# Patient Record
Sex: FEMALE | Race: OTHER | HISPANIC OR LATINO
[De-identification: names, ages, dates, MRNs, and addresses within clinical notes are randomized per-mention and may not be internally consistent; named-entity substitution may affect disease eponyms.]

---

## 2018-10-02 ENCOUNTER — TRANSCRIPTION ENCOUNTER (OUTPATIENT)
Age: 38
End: 2018-10-02

## 2019-02-11 ENCOUNTER — TRANSCRIPTION ENCOUNTER (OUTPATIENT)
Age: 39
End: 2019-02-11

## 2021-03-17 PROBLEM — Z00.00 ENCOUNTER FOR PREVENTIVE HEALTH EXAMINATION: Status: ACTIVE | Noted: 2021-03-17

## 2021-03-18 ENCOUNTER — APPOINTMENT (OUTPATIENT)
Dept: GASTROENTEROLOGY | Facility: CLINIC | Age: 41
End: 2021-03-18
Payer: COMMERCIAL

## 2021-03-18 VITALS
HEART RATE: 72 BPM | DIASTOLIC BLOOD PRESSURE: 62 MMHG | SYSTOLIC BLOOD PRESSURE: 106 MMHG | HEIGHT: 64 IN | TEMPERATURE: 97.6 F | BODY MASS INDEX: 33.63 KG/M2 | WEIGHT: 197 LBS

## 2021-03-18 DIAGNOSIS — K52.9 NONINFECTIVE GASTROENTERITIS AND COLITIS, UNSPECIFIED: ICD-10-CM

## 2021-03-18 PROCEDURE — 99204 OFFICE O/P NEW MOD 45 MIN: CPT | Mod: 25

## 2021-03-18 PROCEDURE — 99072 ADDL SUPL MATRL&STAF TM PHE: CPT

## 2021-03-18 PROCEDURE — 36415 COLL VENOUS BLD VENIPUNCTURE: CPT

## 2021-03-18 RX ORDER — MULTIVITAMIN
TABLET ORAL
Refills: 0 | Status: ACTIVE | COMMUNITY

## 2021-03-18 RX ORDER — OXYCODONE 10 MG/1
10 TABLET ORAL
Refills: 0 | Status: ACTIVE | COMMUNITY

## 2021-03-18 RX ORDER — OMEPRAZOLE 40 MG/1
40 CAPSULE, DELAYED RELEASE ORAL
Refills: 0 | Status: ACTIVE | COMMUNITY

## 2021-03-18 RX ORDER — FAMOTIDINE 40 MG/1
40 TABLET, FILM COATED ORAL
Refills: 0 | Status: ACTIVE | COMMUNITY

## 2021-03-18 NOTE — HISTORY OF PRESENT ILLNESS
[FreeTextEntry1] : 40f fibromyalgia, chronic back pain/opiates, GERD, sleeve gastrectomy '18, here for chronic GERD, bloating, diarrhea.  Has had GERD for many years - reportedly w/ repair of a small h. hernia at time of sleeve.  Heartburn has worsened in last 2y. No dysphagia. + 60lb wt loss after sleeve.  Seems to have hit plateau.  \par Had ENT eval also c/w GERD per pt - and up to omeprazole 40bid and h2b nightly now and still daily symptoms.  No clear triggers.  \par \par Separately, she states that she can't have a normal BM.  States she has to take coffee to have bm, but always loose stool.  Lower abd bloating x years, relieved by BM.   No BRBPR.  \par \par She has chronic elevation of CRP and ESR.\par \par Prior w/u:\par Dr. Perez: \par 4/2016 colonoscopy for irregular bowel habits - normal, exc hemorrhoids.  No bxs.\par 5/2016 EGD w/ moderate h. hernia, A/B esophagitis, schatzki, gastritis; bx: gastritis/reflux change; inc. IEL duodenum\par Stool studies reportedly normal.\par 2016 SBS normal.\par \par Takes her opiates about 2x / wk when body pains at most severe.  Unchanged frequency\par \par Soc:  no tobacco or significant EtOH\par FHx: no FHx GI malignancy or IBD\par \par ROS:\par Constitutional:: no weight loss, fevers\par ENT: no deafness\par Eyes: not blind\par Neck: no LN\par Chest: no dyspnea/cough\par Cardiac: no chest pain\par Vascular: no leg swelling\par GI: no abdominal pain, nausea, vomiting, diarrhea, constipation, rectal bleeding, dysphagia, melena unless otherwise noted in HPI\par : no dysuria, dark urine\par Skin: no rashes, jaundice\par Heme: no bleeding\par Endocrine: no DM unless otherwise stated in HPI\par \par Px: (VS noted below)\par General: NAD\par Eyes: anicteric\par Oropharynx:  clear\par Neck: no LN\par Chest: normal respiratory effort\par CVS: regular\par Abd: soft, NT, ND, +BS, no HSM\par Ext: no atrophy\par Neuro: grossly nonfocal\par \par Labs/imaging/prior endoscopic results reviewed to the extent available and noted in HPI\par

## 2021-03-18 NOTE — REASON FOR VISIT
[Consultation] : a consultation visit [FreeTextEntry1] : Kindly asked by Dr. Gonsalez to consult and evaluate patient for acid reflux, abdominal bloating, abnormal BMs              \par A copy of this note is being sent to physician requesting consultation.

## 2021-03-18 NOTE — ASSESSMENT
[FreeTextEntry1] : -chronic diarrhea - suspect overflow in light of pattern requiring laxatives/coffee stimulus.  Will check for celiac Abs.  Someopiate use compounding problem.  Will start fiber bulking.  Colonoscopy if neg celiac Ab to r/o microscopic colitis\par \par - GERD - Reviewed dietary and lifestyle modifications, including weight loss, smaller/frequent/earlier (>3h prior to lying down) meals, trials of cutting down/out caffeine, chocolate, tomatoes, fatty foods, alcohol.  Sleeve anatomy perhaps contributing.  Will plan EGD as she had erosive change on prior and is now not responding to fairly maximized medical tx; and also to eval for more spec celiac findings\par \par PMD/consultation notes and Labs/imaging/prior endoscopic results reviewed to extent noted in HPI; and, if procedure code billed on this visit for lab draw, this serves to signify that labs were drawn here in this office.\par \par

## 2021-04-09 ENCOUNTER — RESULT REVIEW (OUTPATIENT)
Age: 41
End: 2021-04-09

## 2021-04-11 ENCOUNTER — RESULT REVIEW (OUTPATIENT)
Age: 41
End: 2021-04-11

## 2021-04-12 ENCOUNTER — APPOINTMENT (OUTPATIENT)
Dept: GASTROENTEROLOGY | Facility: HOSPITAL | Age: 41
End: 2021-04-12

## 2021-04-12 ENCOUNTER — RESULT REVIEW (OUTPATIENT)
Age: 41
End: 2021-04-12

## 2021-06-14 ENCOUNTER — APPOINTMENT (OUTPATIENT)
Dept: GASTROENTEROLOGY | Facility: CLINIC | Age: 41
End: 2021-06-14

## 2021-12-08 ENCOUNTER — APPOINTMENT (OUTPATIENT)
Dept: GASTROENTEROLOGY | Facility: CLINIC | Age: 41
End: 2021-12-08
Payer: COMMERCIAL

## 2021-12-08 VITALS
BODY MASS INDEX: 30.9 KG/M2 | SYSTOLIC BLOOD PRESSURE: 102 MMHG | HEART RATE: 80 BPM | OXYGEN SATURATION: 99 % | TEMPERATURE: 97.7 F | HEIGHT: 64 IN | WEIGHT: 181 LBS | DIASTOLIC BLOOD PRESSURE: 70 MMHG

## 2021-12-08 PROCEDURE — 99072 ADDL SUPL MATRL&STAF TM PHE: CPT

## 2021-12-08 PROCEDURE — 99214 OFFICE O/P EST MOD 30 MIN: CPT | Mod: 25

## 2021-12-08 PROCEDURE — 36415 COLL VENOUS BLD VENIPUNCTURE: CPT

## 2021-12-08 NOTE — HISTORY OF PRESENT ILLNESS
[FreeTextEntry1] : 40f fibromyalgia, chronic back pain/opiates, GERD, sleeve gastrectomy '18, GERD, here for f/u chronic bloating, constipation/diarrhea. \par \par -4/2021 EGD for gerd - gastritis; some patchy increased IELs--> labs never received by lab?\par -4/2021 colonoscopy for chronic diarrhea - normal\par \par GERD - better controllde now on omeprazole 40mg\par \par Had liposcuction 2mo ago - some vague "tightness" in abdomen since, with cont'd bloating, and more constipation - BMs q3-4d, then w/ dulcolax has crampy diarrhea.  No n/v/fever.\par \par Sx of bloating resolve w/ BMs.\par \par Labs 11/2021 had mild anemia 10s- sl. low iron indices.  Normal LFTs. \par \par She has chronic elevation of CRP and ESR.\par \par Prior w/u:\par Dr. Perez: \par 4/2016 colonoscopy for irregular bowel habits - normal, exc hemorrhoids.  No bxs.\par 5/2016 EGD w/ moderate h. hernia, A/B esophagitis, schatzki, gastritis; bx: gastritis/reflux change; inc. IEL duodenum\par Stool studies reportedly normal.\par 2016 SBS normal.\par \par Takes her opiates about 2x / wk when body pains at most severe.  Unchanged frequency\par \par Soc:  no tobacco or significant EtOH\par FHx: no FHx GI malignancy or IBD\par \par ROS:\par Constitutional:: no weight loss, fevers\par ENT: no deafness\par Eyes: not blind\par Neck: no LN\par Chest: no dyspnea/cough\par Cardiac: no chest pain\par Vascular: no leg swelling\par GI: no abdominal pain, nausea, vomiting, diarrhea, constipation, rectal bleeding, dysphagia, melena unless otherwise noted in HPI\par : no dysuria, dark urine\par Skin: no rashes, jaundice\par Heme: no bleeding\par Endocrine: no DM unless otherwise stated in HPI\par \par Px: (VS noted below)\par General: NAD\par Eyes: anicteric\par Oropharynx:  clear\par Neck: no LN\par Chest: normal respiratory effort\par CVS: regular\par Abd: soft, NT, ND, +BS, no HSM\par Ext: no atrophy\par Neuro: grossly nonfocal\par \par Labs/imaging/prior endoscopic results reviewed to the extent available and noted in HPI\par

## 2021-12-08 NOTE — ASSESSMENT
[FreeTextEntry1] : -chronic bloating/constipation >diarrhea - Will check for celiac Abs, but suspect miralax will help.  She was taking metamucil, but stopped for unclear reasons.  Advsied to avoid opiates.\par \par - GERD - better controlled now.  Cont diet, ppi with attempts to taper off\par \par -Colon cancer screening - colonoscopy due age 44\par \par PMD/consultation notes and Labs/imaging/prior endoscopic results reviewed to extent noted in HPI; and, if procedure code billed on this visit for lab draw, this serves to signify that labs were drawn here in this office.\par \par

## 2021-12-10 ENCOUNTER — NON-APPOINTMENT (OUTPATIENT)
Age: 41
End: 2021-12-10

## 2022-01-05 ENCOUNTER — APPOINTMENT (OUTPATIENT)
Dept: NEUROLOGY | Facility: CLINIC | Age: 42
End: 2022-01-05
Payer: COMMERCIAL

## 2022-01-05 DIAGNOSIS — R20.9 UNSPECIFIED DISTURBANCES OF SKIN SENSATION: ICD-10-CM

## 2022-01-05 DIAGNOSIS — Z87.891 PERSONAL HISTORY OF NICOTINE DEPENDENCE: ICD-10-CM

## 2022-01-05 DIAGNOSIS — Z86.69 PERSONAL HISTORY OF OTHER DISEASES OF THE NERVOUS SYSTEM AND SENSE ORGANS: ICD-10-CM

## 2022-01-05 PROCEDURE — 99203 OFFICE O/P NEW LOW 30 MIN: CPT | Mod: 95

## 2022-01-05 NOTE — HISTORY OF PRESENT ILLNESS
[FreeTextEntry1] : 41 yr old female with long standing history of of pain in her legs since childhood . This feeling affects her sleep making her restless through out the night. It is aching pain and she feels she wants massage her legs. Occasionally tingling but mainly throbbing pain in legs. The pain is limited to both feet up to her knees . She denies any sharp shooting pain radiation from her back. The uncomfortable sensation it worse at rest than while walking. There is no weakness in her legs

## 2022-01-05 NOTE — PHYSICAL EXAM
[FreeTextEntry1] : Physical examination \par General: No acute distress, Awake, Alert.   \par \par Mental status \par Awake, alert, and oriented to person, time and place, Normal attention span and concentration, Recent and remote memory intact, Language intact, Fund of knowledge intact.   \par \par Gives detailed history.\par \par Cranial Nerves \par III, IV, VI: EOMI.   \par V: Facial sensation is normal B/L.   \par VII: Facial strength is normal B/L. \par VIII: Gross hearing is intact.    \par \par Decreased hearing, B.\par \par XII: Tongue midline without atrophy or fasciculations. \par \par Motor exam  \par Moving all 4 ext sym and well.\par No UE drift.\par RSM- symmetric.\par \par Sensation reports abnormal sensation from feet to mid calf \par \par \par Coordination \par Finger to nose: Normal.  \par Heel to shin: Normal.\par \par Gait \par Normal including heels, toes, and tandem gait.  \par absent Romberg\par \par

## 2022-01-05 NOTE — REVIEW OF SYSTEMS
[Fever] : no fever [Chills] : no chills [Feeling Poorly] : not feeling poorly [Feeling Tired] : not feeling tired [Recent Weight Gain (___ Lbs)] : no recent weight gain [Recent Weight Loss (___ Lbs)] : no recent weight loss [Confused or Disoriented] : no confusion [Memory Lapses or Loss] : no memory loss [Decr. Concentrating Ability] : no decrease in concentrating ability [Difficulty with Language] : no ~M difficulty with language [Changed Thought Patterns] : no change in thought patterns [Repeating Questions] : no repeated questioning about recent events [Facial Weakness] : no facial weakness [Hand Weakness] : no hand weakness [Leg Weakness] : no leg weakness [Poor Coordination] : good coordination [Difficulty Writing] : no difficulty writing [Difficulties in Speech] : no speech difficulties [Numbness] : no numbness [Tingling] : tingling [Abnormal Sensation] : an abnormal sensation [Hypersensitivity] : no hypersensitivity [Seizures] : no convulsions [Dizziness] : no dizziness [Fainting] : no fainting [Lightheadedness] : no lightheadedness [Vertigo] : no vertigo [Cluster Headache] : no cluster headache [Migraine Headache] : migraine headaches [Tension Headache] : no tension-type headache [Difficulty Walking] : no difficulty walking [Inability to Walk] : able to walk [Ataxia] : no ataxia [Frequent Falls] : not falling [Limping] : not limping [Shortness Of Breath] : no shortness of breath [Arthralgias] : arthralgias [Joint Pain] : joint pain [Joint Swelling] : no joint swelling [Joint Stiffness] : no joint stiffness [Limb Pain] : limb pain [Limb Swelling] : no limb swelling [Skin Lesions] : no skin lesions [Skin Wound] : no skin wound [Itching] : itching [Change In A Mole] : no change in a mole [Breast Pain] : no breast pain [Breast Lump] : no breast lump [Muscle Weakness] : no muscle weakness [Feelings Of Weakness] : no feelings of weakness [Easy Bleeding] : no tendency for easy bleeding [Easy Bruising] : a tendency for easy bruising [Swollen Glands] : no swollen glands [Swollen Glands In The Neck] : no swollen glands in the neck

## 2022-01-05 NOTE — REASON FOR VISIT
[Home] : at home, [unfilled] , at the time of the visit. [Medical Office: ([facility])___] : at the medical office located in  [Verbal consent obtained from patient] : the patient, [unfilled] [Consultation] : a consultation visit [FreeTextEntry1] : pain in legs

## 2022-01-05 NOTE — ASSESSMENT
[FreeTextEntry1] : 41 yr old presenting with complaints of discomfort in both legs in distal symmetric distribution with subjective loss of sensation suggestive of peripheral neuropathy. Will refer for EMG?NCV to confirm the diagnosis. Systemic symptoms include diffuse bod aches and easy bruising. Limited labs for review but markedly elevated IgA level noted. Will refer for cbc, bmp, marsha, serum and immune electrophoresis, urine electrophoresis , APL antibodies, B12 panel, HbA1c . Will refer to Hematology for markedly elevated immunoglobulin and its significance.

## 2022-01-25 ENCOUNTER — RESULT REVIEW (OUTPATIENT)
Age: 42
End: 2022-01-25

## 2022-01-25 ENCOUNTER — APPOINTMENT (OUTPATIENT)
Dept: HEMATOLOGY ONCOLOGY | Facility: CLINIC | Age: 42
End: 2022-01-25
Payer: COMMERCIAL

## 2022-01-25 VITALS
RESPIRATION RATE: 16 BRPM | OXYGEN SATURATION: 100 % | BODY MASS INDEX: 31.04 KG/M2 | SYSTOLIC BLOOD PRESSURE: 122 MMHG | WEIGHT: 181.8 LBS | DIASTOLIC BLOOD PRESSURE: 73 MMHG | HEART RATE: 79 BPM | TEMPERATURE: 97.9 F | HEIGHT: 64.17 IN

## 2022-01-25 PROCEDURE — 99205 OFFICE O/P NEW HI 60 MIN: CPT | Mod: 25

## 2022-01-25 PROCEDURE — 36415 COLL VENOUS BLD VENIPUNCTURE: CPT

## 2022-01-25 NOTE — CONSULT LETTER
[Dear  ___] : Dear  [unfilled], [Consult Letter:] : I had the pleasure of evaluating your patient, [unfilled]. [Please see my note below.] : Please see my note below. [Consult Closing:] : Thank you very much for allowing me to participate in the care of this patient.  If you have any questions, please do not hesitate to contact me. [Sincerely,] : Sincerely, [FreeTextEntry3] : Maurilio Wilkinson MD, MPH\par Attending Physician\par Hematology Oncology\par [facility] [place]\par [facility]\par

## 2022-01-25 NOTE — ASSESSMENT
[FreeTextEntry1] : Symptoms: Extreme tiredness and pain (has fibromyalgia). Exhaustion makes her overwhelmed \par Her symptoms have been chronic\par \par Elevated IgA\par Has Fibromyalgia\par Used to follow with Dr Leone - has not seen him for the past decade\par Discussed about the findings  and differential diagnosis including several inflammatory disorders, including IgA nephropathy, immunoglobulin A vasculitis (Henoch-Schönlein purpura), acquired immune deficiency syndrome (AIDS), alcoholic cirrhosis, advanced hepatitis, IgA myeloma, and several autoimmune diseases (eg, rheumatoid arthritis, systemic lupus erythematosus)\par Send ESR, CRP, myeloma panel, CTD panel\par Advised to reduce alcohol intake\par \par Tiredness and fatigue\par Likely related to inflammation vs low iron\par HO gastric sleeve surgery\par 2019 at [facility]\par Send B12, folate, iron studies\par \par Social Hx\par Lives with her 4 kids (15, 18, 21, 25 years)\par 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 Smoked 1 pack per 2 weeks x15 years\par \par Patient had multiple questions which were answered to satisfaction\par \par Follow up in 2-3 weeks\par No labs

## 2022-01-25 NOTE — HISTORY OF PRESENT ILLNESS
[de-identified] : Ms. Manju Peña is 41 year old female with monoclonal gammopathy here for consultation, referred by Dr. Norma Woodward. \par \par Patient with past medical history of GERD, fibromyalgia, Sleeve gastrectomy in 2019,  chronic diarrhea/constipation with gastritis on EGD/Colonoscopy in 4/2021. \par Patient abdominal skin removal in Sept 2021\par She was told that  she has low iron and vitamin b12 -last blood work was three months ago (10/21) with her PCP. Started Iron on and off but has yet started b12. \par \par 19/9/21 Immunoglobin A 720\par \par FHx: Denies any family history of hematological and/or oncological disorder\par SHx: Former smoker (quit around 2012)  occasional alcohol use\par \par She has lost around 70 lbs since her gastric surgery and keeping it off. \par Patient has fibromyalgia symptoms with pain on her neck, joints, and muscles. Currently not taking medications. \par \par Normal menses, 4-5 days monthly, LMP - 1/15/22\par Mammogram -tentative scheduled for 1/31/22

## 2022-01-25 NOTE — REVIEW OF SYSTEMS
[Fatigue] : fatigue [Joint Stiffness] : joint stiffness [Muscle Pain] : muscle pain [Negative] : Allergic/Immunologic

## 2022-02-11 ENCOUNTER — APPOINTMENT (OUTPATIENT)
Dept: HEMATOLOGY ONCOLOGY | Facility: CLINIC | Age: 42
End: 2022-02-11
Payer: COMMERCIAL

## 2022-02-11 VITALS
SYSTOLIC BLOOD PRESSURE: 116 MMHG | HEART RATE: 73 BPM | TEMPERATURE: 98.3 F | WEIGHT: 184.38 LBS | HEIGHT: 64.17 IN | OXYGEN SATURATION: 100 % | BODY MASS INDEX: 31.48 KG/M2 | RESPIRATION RATE: 18 BRPM | DIASTOLIC BLOOD PRESSURE: 69 MMHG

## 2022-02-11 PROCEDURE — 99214 OFFICE O/P EST MOD 30 MIN: CPT

## 2022-02-11 NOTE — CONSULT LETTER
[Dear  ___] : Dear  [unfilled], [Consult Letter:] : I had the pleasure of evaluating your patient, [unfilled]. [Please see my note below.] : Please see my note below. [Consult Closing:] : Thank you very much for allowing me to participate in the care of this patient.  If you have any questions, please do not hesitate to contact me. [Sincerely,] : Sincerely, [FreeTextEntry3] : Maurilio Wilkinson MD, MPH\par Attending Physician\par Hematology Oncology\par [facility] [place]\par [facility]\par

## 2022-02-11 NOTE — HISTORY OF PRESENT ILLNESS
[de-identified] : Ms. Manju Peña is 41 year old female with monoclonal gammopathy here for consultation, referred by Dr. Norma Woodward. \par \par Patient with past medical history of GERD, fibromyalgia, Sleeve gastrectomy in 2019,  chronic diarrhea/constipation with gastritis on EGD/Colonoscopy in 4/2021. \par Patient abdominal skin removal in Sept 2021\par She was told that  she has low iron and vitamin b12 -last blood work was three months ago (10/21) with her PCP. Started Iron on and off but has yet started b12. \par \par 19/9/21 Immunoglobin A 720\par \par FHx: Denies any family history of hematological and/or oncological disorder\par SHx: Former smoker (quit around 2012)  occasional alcohol use\par \par She has lost around 70 lbs since her gastric surgery and keeping it off. \par Patient has fibromyalgia symptoms with pain on her neck, joints, and muscles. Currently not taking medications. \par \par Normal menses, 4-5 days monthly, LMP - 1/15/22\par Mammogram -tentative scheduled for 1/31/22 [de-identified] : Patient is seen today for follow up\par \par Went to [facility] ED for abdominal pain-> had CT which apparently showed something is the liver\par She is not sure but thinks that she was advised to obtain MRI\par Symptoms better but not resolved

## 2022-02-11 NOTE — ASSESSMENT
[FreeTextEntry1] : Symptoms: Extreme tiredness and pain (has fibromyalgia). Exhaustion makes her overwhelmed \par Her symptoms have been chronic\par \par Elevated IgA\par Has Fibromyalgia\par HEMA nad CTD rule out autoimmune phenomenon\par SPEP, IFx, FLCR - show IgA Lambda monoclonal gammopathy\par M Dewayne 0.4\par Given her symptomatology, type of paraproteins ->obtain bone marrow and PET/CT to rule out bone mets\par Procedure explained, she is agreeable\par \par Tiredness and fatigue\par HO gastric sleeve surgery 2019 at [facility]\par Work up shows severe iron deficiency- ferritin 3\par Discussed at length about iron deficiency- etiology, signs and symptoms, complications, management options\par DIscussed about oral vs IV Iron\par I have reviewed the risks, benefits and side effects of IV iron with the patient. All questions were answered to satisfaction. 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 Smoked 1 pack per 2 weeks x15 years\par \par Patient had multiple questions which were answered to satisfaction\par \par Follow up in 2 weeks for bone marrow (she has that week off)\par CBC

## 2022-02-25 ENCOUNTER — TRANSCRIPTION ENCOUNTER (OUTPATIENT)
Age: 42
End: 2022-02-25

## 2022-03-08 ENCOUNTER — RESULT REVIEW (OUTPATIENT)
Age: 42
End: 2022-03-08

## 2022-03-08 DIAGNOSIS — R76.8 OTHER SPECIFIED ABNORMAL IMMUNOLOGICAL FINDINGS IN SERUM: ICD-10-CM

## 2022-03-09 ENCOUNTER — APPOINTMENT (OUTPATIENT)
Dept: HEMATOLOGY ONCOLOGY | Facility: CLINIC | Age: 42
End: 2022-03-09
Payer: COMMERCIAL

## 2022-03-09 ENCOUNTER — RESULT REVIEW (OUTPATIENT)
Age: 42
End: 2022-03-09

## 2022-03-09 VITALS
SYSTOLIC BLOOD PRESSURE: 94 MMHG | TEMPERATURE: 97.3 F | WEIGHT: 175.25 LBS | BODY MASS INDEX: 29.92 KG/M2 | OXYGEN SATURATION: 98 % | HEIGHT: 64.17 IN | DIASTOLIC BLOOD PRESSURE: 63 MMHG | RESPIRATION RATE: 16 BRPM | HEART RATE: 76 BPM

## 2022-03-09 PROCEDURE — 99214 OFFICE O/P EST MOD 30 MIN: CPT | Mod: 25

## 2022-03-09 PROCEDURE — 38222 DX BONE MARROW BX & ASPIR: CPT

## 2022-03-09 PROCEDURE — 36415 COLL VENOUS BLD VENIPUNCTURE: CPT

## 2022-03-09 NOTE — PROCEDURE
[Bone Marrow Biopsy] : bone marrow biopsy [Bone Marrow Aspiration] : bone marrow aspiration  [Patient] : the patient [Patient identification verified] : patient identification verified [Procedure verified and consent obtained] : procedure verified and consent obtained [Laterality verified and correct site marked] : laterality verified and correct site marked [Left] : site: left [Correct positioning] : correct positioning [Prone] : prone [Superior iliac spine was identified] : the superior iliac spine was identified. [The left posterior iliac crest was prepped with betadine and draped, using sterile technique.] : The left posterior iliac crest was prepped with betadine and draped, using sterile technique. [Lidocaine was injected and into the periosteum overlying the site.] : Lidocaine was injected and into the periosteum overlying the site. [Aspirate] : aspirate [Cytogenetics] : cytogenetics [FISH] : FISH [PCR] : PCR [Other ___] : [unfilled] [Biopsy] : biopsy [Flow Cytometry] : flow cytometry [] : The patient was instructed to remove the bandage the following AM. The patient may bathe. Acetaminophen may be taken for discomfort, as per package directions.If there are any other problems, the patient was instructed to call the office. The patient verbalized understanding, and is aware of the office contact numbers. [FreeTextEntry1] : Monoclonal gammopathy

## 2022-03-09 NOTE — HISTORY OF PRESENT ILLNESS
[de-identified] : Ms. Manju Peña is 41 year old female with monoclonal gammopathy here for consultation, referred by Dr. Norma Woodward. \par \par Patient with past medical history of GERD, fibromyalgia, Sleeve gastrectomy in 2019,  chronic diarrhea/constipation with gastritis on EGD/Colonoscopy in 4/2021. \par Patient abdominal skin removal in Sept 2021\par She was told that  she has low iron and vitamin b12 -last blood work was three months ago (10/21) with her PCP. Started Iron on and off but has yet started b12. \par \par 19/9/21 Immunoglobin A 720\par \par FHx: Denies any family history of hematological and/or oncological disorder\par SHx: Former smoker (quit around 2012)  occasional alcohol use\par \par She has lost around 70 lbs since her gastric surgery and keeping it off. \par Patient has fibromyalgia symptoms with pain on her neck, joints, and muscles. Currently not taking medications. \par \par Normal menses, 4-5 days monthly, LMP - 1/15/22\par Mammogram -tentative scheduled for 1/31/22 [de-identified] : Patient is seen today for follow up and bone marrow\par She is also scheduled to get her first iron infusion today\par Accompanied by her daughter (Cathie)\par \par No new symptoms\par Went to [facility] ED for abdominal pain-> had CT which apparently showed something is the liver. She is scheduled for MRI\par \par PET/CT (2/2022)\par No evidence of lytic bone lesions

## 2022-03-09 NOTE — ASSESSMENT
[FreeTextEntry1] : Symptoms: Extreme tiredness and pain (has fibromyalgia). 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 Smoked 1 pack per 2 weeks x15 years\par \par Patient had multiple questions which were answered to satisfaction\par \par Follow up in 2 weeks\par No labs

## 2022-03-09 NOTE — CONSULT LETTER
[Dear  ___] : Dear  [unfilled], [Consult Letter:] : I had the pleasure of evaluating your patient, [unfilled]. [Please see my note below.] : Please see my note below. [Consult Closing:] : Thank you very much for allowing me to participate in the care of this patient.  If you have any questions, please do not hesitate to contact me. [Sincerely,] : Sincerely, [FreeTextEntry3] : Maurilio Wilkinson MD, MPH\par Attending Physician\par Hematology Oncology\par [facility] [place]\par [facility]\par

## 2022-03-10 ENCOUNTER — LABORATORY RESULT (OUTPATIENT)
Age: 42
End: 2022-03-10

## 2022-03-15 ENCOUNTER — RESULT REVIEW (OUTPATIENT)
Age: 42
End: 2022-03-15

## 2022-03-25 ENCOUNTER — APPOINTMENT (OUTPATIENT)
Dept: HEMATOLOGY ONCOLOGY | Facility: CLINIC | Age: 42
End: 2022-03-25
Payer: COMMERCIAL

## 2022-03-25 VITALS
WEIGHT: 177.13 LBS | RESPIRATION RATE: 16 BRPM | SYSTOLIC BLOOD PRESSURE: 95 MMHG | HEART RATE: 85 BPM | OXYGEN SATURATION: 99 % | BODY MASS INDEX: 30.24 KG/M2 | HEIGHT: 64.17 IN | TEMPERATURE: 98.1 F | DIASTOLIC BLOOD PRESSURE: 62 MMHG

## 2022-03-25 PROCEDURE — 99214 OFFICE O/P EST MOD 30 MIN: CPT

## 2022-03-25 NOTE — ASSESSMENT
[FreeTextEntry1] : Symptoms: Extreme tiredness and pain (has fibromyalgia). Exhaustion makes her overwhelmed \par Her symptoms have been chronic\par \par Elevated IgA\par Has Fibromyalgia\par HEMA nad CTD rule out autoimmune phenomenon\par SPEP, IFx, FLCR - show IgA Lambda monoclonal gammopathy\par M Dewayne 0.4\par 2/2022 PET/CT- no bone mets\par 3/22/22 Bone marrow - 10-15% involvement by  immunostain\par -Went over labs and bone marrow with patient, explained to her that she has smoldering given that she has 10-15% (>10%) bone marrow clonal plasma cells with no  lytic lesions, hypercalcemia, and renal insufficiency (end-organ damage), we'll continue to closely monitor for now. \par -Her anemia is being corrected with Venofer 200 mg x 5 with last infusion today (3/25/22)\par -Patient has multiple questions which were all answered\par \par #right upper quadrant pain and tightness/bloating\par -2/2 to gallstone, Follows by GI Dr. 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 Smoked 1 pack per 2 weeks x15 years\par \par Patient had multiple questions which were answered to satisfaction\par \par rtc in 3 months for follow up\par cbc, cmp, urine/serum MM, b12/folate, iron panel.

## 2022-03-25 NOTE — HISTORY OF PRESENT ILLNESS
[de-identified] : Ms. Manju Peña is 41 year old female with monoclonal gammopathy here for consultation, referred by Dr. Norma Woodward. \par \par Patient with past medical history of GERD, fibromyalgia, Sleeve gastrectomy in 2019,  chronic diarrhea/constipation with gastritis on EGD/Colonoscopy in 4/2021. \par Patient abdominal skin removal in Sept 2021\par She was told that  she has low iron and vitamin b12 -last blood work was three months ago (10/21) with her PCP. Started Iron on and off but has yet started b12. \par \par 19/9/21 Immunoglobin A 720\par \par FHx: Denies any family history of hematological and/or oncological disorder\par SHx: Former smoker (quit around 2012)  occasional alcohol use\par \par She has lost around 70 lbs since her gastric surgery and keeping it off. \par Patient has fibromyalgia symptoms with pain on her neck, joints, and muscles. Currently not taking medications. \par \par Normal menses, 4-5 days monthly, LMP - 1/15/22\par Mammogram -tentative scheduled for 1/31/22 [de-identified] : Patient is seen today for follow up and bone marrow\par Now on her 5th Iron Infusion today 3/25/22\par \par Patient continues to have right upper quadrant pain, bloating, tightness and early satiety. Seen by GI Dr. Bolanos at [facility] with MRI (3/10/22) noted for stone. She was advised to follow up in April 2022. \par She is tolerating iron infusion well \par Her menses are normal, started Tuesday\par She has yet started her b12 supplement. \par \par 2/22/22 Bone marrow\par - 10-15% involvement by  immunostain\par 12% involvement by aspirate\par 0.2% of cells involvement by flow cytometry\par -Hypercellular marrow with suppressed background trilineage hematopoiesis. \par FISH - normal results. \par PET/CT (2/2022)\par No evidence of lytic bone lesions

## 2022-03-25 NOTE — PROCEDURE
[Bone Marrow Biopsy] : bone marrow biopsy [Bone Marrow Aspiration] : bone marrow aspiration  [Patient identification verified] : patient identification verified [Patient] : the patient [Procedure verified and consent obtained] : procedure verified and consent obtained [Laterality verified and correct site marked] : laterality verified and correct site marked [Left] : site: left [Correct positioning] : correct positioning [Prone] : prone [Superior iliac spine was identified] : the superior iliac spine was identified. [The left posterior iliac crest was prepped with betadine and draped, using sterile technique.] : The left posterior iliac crest was prepped with betadine and draped, using sterile technique. [Lidocaine was injected and into the periosteum overlying the site.] : Lidocaine was injected and into the periosteum overlying the site. [Aspirate] : aspirate [Cytogenetics] : cytogenetics [FISH] : FISH [PCR] : PCR [Other ___] : [unfilled] [Biopsy] : biopsy [Flow Cytometry] : flow cytometry [] : The patient was instructed to remove the bandage the following AM. The patient may bathe. Acetaminophen may be taken for discomfort, as per package directions.If there are any other problems, the patient was instructed to call the office. The patient verbalized understanding, and is aware of the office contact numbers. [FreeTextEntry1] : Monoclonal gammopathy

## 2022-03-25 NOTE — CONSULT LETTER
[Dear  ___] : Dear  [unfilled], [Consult Letter:] : I had the pleasure of evaluating your patient, [unfilled]. [Please see my note below.] : Please see my note below. [Consult Closing:] : Thank you very much for allowing me to participate in the care of this patient.  If you have any questions, please do not hesitate to contact me. [Sincerely,] : Sincerely, [FreeTextEntry3] : Maurilio Wilkinson MD, MPH\par Attending Physician\par Hematology Oncology\par [facility] [place]\par [facility]\par

## 2022-05-09 ENCOUNTER — APPOINTMENT (OUTPATIENT)
Dept: GASTROENTEROLOGY | Facility: CLINIC | Age: 42
End: 2022-05-09
Payer: COMMERCIAL

## 2022-05-09 VITALS
DIASTOLIC BLOOD PRESSURE: 70 MMHG | HEIGHT: 64 IN | WEIGHT: 171 LBS | HEART RATE: 76 BPM | TEMPERATURE: 99.1 F | SYSTOLIC BLOOD PRESSURE: 114 MMHG | OXYGEN SATURATION: 99 % | BODY MASS INDEX: 29.19 KG/M2

## 2022-05-09 DIAGNOSIS — Z12.11 ENCOUNTER FOR SCREENING FOR MALIGNANT NEOPLASM OF COLON: ICD-10-CM

## 2022-05-09 DIAGNOSIS — K21.9 GASTRO-ESOPHAGEAL REFLUX DISEASE W/OUT ESOPHAGITIS: ICD-10-CM

## 2022-05-09 DIAGNOSIS — K64.4 RESIDUAL HEMORRHOIDAL SKIN TAGS: ICD-10-CM

## 2022-05-09 DIAGNOSIS — R14.0 ABDOMINAL DISTENSION (GASEOUS): ICD-10-CM

## 2022-05-09 PROCEDURE — 99214 OFFICE O/P EST MOD 30 MIN: CPT

## 2022-05-09 RX ORDER — CHOLECALCIFEROL (VITAMIN D3) 25 MCG
TABLET ORAL
Refills: 0 | Status: DISCONTINUED | COMMUNITY
End: 2022-05-09

## 2022-05-09 RX ORDER — HYDROCORTISONE 25 MG/G
2.5 OINTMENT TOPICAL
Qty: 20 | Refills: 2 | Status: ACTIVE | COMMUNITY
Start: 2022-05-09 | End: 1900-01-01

## 2022-05-09 RX ORDER — ZINC SULFATE TAB 220 MG (50 MG ZINC EQUIVALENT) 220 (50 ZN) MG
TAB ORAL
Refills: 0 | Status: DISCONTINUED | COMMUNITY
End: 2022-05-09

## 2022-05-09 RX ORDER — LIDOCAINE 50 MG/G
5 CREAM TOPICAL DAILY
Qty: 1 | Refills: 0 | Status: ACTIVE | COMMUNITY
Start: 2022-05-09 | End: 1900-01-01

## 2022-05-09 NOTE — ASSESSMENT
[FreeTextEntry1] : -hemorrhoid - Topical treatments - topical steroid, soaks, gentle wipes (witchhazel) or shower. Reviewed dietary and lifestyle modifications, including increased fluid, fiber intake, as well as habituation / following urge to defecate, cutting back on bread/pasta, and to consider staying on fiber supplement (eg.,  psyllium)\par \par -IBS - chronic bloating/constipation >diarrhea - resume metamucil.\par \par - GERD - dietary and lifestyle modifications, including weight loss, smaller/frequent/earlier (>3h prior to lying down) meals, trials of cutting down/out caffeine, chocolate, tomatoes, fatty foods, alcohol.  Cont. ppi w/ taper attempts over time.\par \par -Colon cancer screening - colonoscopy due age 44\par \par PMD/consultation notes and Labs/imaging/prior endoscopic results reviewed to extent noted in HPI; and, if procedure code billed on this visit for lab draw, this serves to signify that labs were drawn here in this office.\par \par

## 2022-05-09 NOTE — REASON FOR VISIT
[Follow-Up: _____] : a [unfilled] follow-up visit EDIE (acute kidney injury) Mitral insufficiency Advanced care planning/counseling discussion

## 2022-05-09 NOTE — HISTORY OF PRESENT ILLNESS
[FreeTextEntry1] : 41f fibromyalgia, "smoldering" myeloma (heme), chronic back pain/opiates, GERD, sleeve gastrectomy '18, liposuction, GERD, here for anal pain and bulge x 2d.  Had diarrhea (as she often does as part of her IBS C/D) Saturday - irritated anal area with progressive bulge - painful, unable to sit.  Not taking opiates.  No rectal bleeding. Worse w/ BMs.  No abd pain, n/v.  Noncompliant w/ fiber. \par \par Has heartburn which has worsened in last few months - on ppi regularly but not completely helping. Noncompliant w/ diet.  No dysphagia, wt loss.\par \par Labs 11/2021 had mild anemia 10s- sl. low iron indices.  Normal LFTs. Heme notes reviewed - followed for smoldering myeloma.\par \par Prior w/u:\par Dr. Perez: \par 4/2016 colonoscopy for irregular bowel habits - normal, exc hemorrhoids.  No bxs.\par 5/2016 EGD w/ moderate h. hernia, A/B esophagitis, schatzki, gastritis; bx: gastritis/reflux change; inc. IEL duodenum\par Stool studies reportedly normal.\par 2016 SBS normal.\par \par W/U w/ me:\par Has chronic bloating, constipation/diarrhea, GERD:. \par -4/2021 EGD for gerd - gastritis; some patchy increased IELs--> nl ttg, elevatee IgA - heme - smoldering Myeloma\par -4/2021 colonoscopy for chronic diarrhea - normal\par \par Soc:  no tobacco or significant EtOH\par FHx: no FHx GI malignancy or IBD\par \par ROS:\par Constitutional:: no weight loss, fevers\par ENT: no deafness\par Eyes: not blind\par Neck: no LN\par Chest: no dyspnea/cough\par Cardiac: no chest pain\par Vascular: no leg swelling\par GI: no abdominal pain, nausea, vomiting, diarrhea, constipation, rectal bleeding, dysphagia, melena unless otherwise noted in HPI\par : no dysuria, dark urine\par Skin: no rashes, jaundice\par Heme: no bleeding\par Endocrine: no DM unless otherwise stated in HPI\par \par Px: (VS noted below)\par General: NAD\par Eyes: anicteric\par Oropharynx:  clear\par Neck: no LN\par Chest: normal respiratory effort\par CVS: regular\par Abd: soft, NT, ND, +BS, no HSM\par Ext: no atrophy\par Neuro: grossly nonfocal\par Rectal - L sided thrombosed ext hemorrhoid w/ clot unroofed partially.\par \par Labs/imaging/prior endoscopic results reviewed to the extent available and noted in HPI\par

## 2022-06-24 ENCOUNTER — RESULT REVIEW (OUTPATIENT)
Age: 42
End: 2022-06-24

## 2022-06-24 ENCOUNTER — APPOINTMENT (OUTPATIENT)
Dept: HEMATOLOGY ONCOLOGY | Facility: CLINIC | Age: 42
End: 2022-06-24

## 2022-06-24 VITALS
HEIGHT: 64 IN | WEIGHT: 178.25 LBS | TEMPERATURE: 97.5 F | DIASTOLIC BLOOD PRESSURE: 70 MMHG | HEART RATE: 81 BPM | SYSTOLIC BLOOD PRESSURE: 96 MMHG | RESPIRATION RATE: 16 BRPM | OXYGEN SATURATION: 99 % | BODY MASS INDEX: 30.43 KG/M2

## 2022-06-30 ENCOUNTER — APPOINTMENT (OUTPATIENT)
Dept: HEMATOLOGY ONCOLOGY | Facility: CLINIC | Age: 42
End: 2022-06-30

## 2022-06-30 VITALS
RESPIRATION RATE: 16 BRPM | SYSTOLIC BLOOD PRESSURE: 108 MMHG | TEMPERATURE: 98.6 F | BODY MASS INDEX: 30.13 KG/M2 | DIASTOLIC BLOOD PRESSURE: 72 MMHG | HEIGHT: 63.98 IN | WEIGHT: 176.5 LBS | OXYGEN SATURATION: 100 % | HEART RATE: 64 BPM

## 2022-06-30 PROCEDURE — 99214 OFFICE O/P EST MOD 30 MIN: CPT | Mod: 25

## 2022-06-30 PROCEDURE — 36415 COLL VENOUS BLD VENIPUNCTURE: CPT

## 2022-06-30 RX ORDER — NITROFURANTOIN (MONOHYDRATE/MACROCRYSTALS) 25; 75 MG/1; MG/1
100 CAPSULE ORAL
Qty: 14 | Refills: 0 | Status: COMPLETED | COMMUNITY
Start: 2022-03-29

## 2022-06-30 RX ORDER — ALBUTEROL SULFATE 90 UG/1
108 (90 BASE) INHALANT RESPIRATORY (INHALATION)
Qty: 7 | Refills: 0 | Status: COMPLETED | COMMUNITY
Start: 2022-04-20

## 2022-06-30 RX ORDER — TERCONAZOLE 4 MG/G
0.4 CREAM VAGINAL
Qty: 45 | Refills: 0 | Status: COMPLETED | COMMUNITY
Start: 2022-06-14

## 2022-06-30 RX ORDER — FLUCONAZOLE 150 MG/1
150 TABLET ORAL
Qty: 1 | Refills: 0 | Status: COMPLETED | COMMUNITY
Start: 2022-03-11

## 2022-06-30 RX ORDER — OLOPATADINE HYDROCHLORIDE 665 UG/1
0.6 SPRAY, METERED NASAL
Qty: 30 | Refills: 0 | Status: COMPLETED | COMMUNITY
Start: 2022-04-06

## 2022-06-30 RX ORDER — ALBUTEROL SULFATE 2.5 MG/3ML
(2.5 MG/3ML) SOLUTION RESPIRATORY (INHALATION)
Qty: 75 | Refills: 0 | Status: COMPLETED | COMMUNITY
Start: 2022-04-20

## 2022-06-30 RX ORDER — AMOXICILLIN AND CLAVULANATE POTASSIUM 500; 125 MG/1; MG/1
500-125 TABLET, FILM COATED ORAL
Qty: 21 | Refills: 0 | Status: COMPLETED | COMMUNITY
Start: 2022-06-14

## 2022-06-30 RX ORDER — AMOXICILLIN 500 MG/1
500 CAPSULE ORAL
Qty: 21 | Refills: 0 | Status: COMPLETED | COMMUNITY
Start: 2022-04-06

## 2022-06-30 RX ORDER — FLUTICASONE PROPIONATE 50 UG/1
50 SPRAY, METERED NASAL
Qty: 16 | Refills: 0 | Status: COMPLETED | COMMUNITY
Start: 2022-04-06

## 2022-06-30 RX ORDER — METRONIDAZOLE 7.5 MG/G
0.75 GEL VAGINAL
Qty: 70 | Refills: 0 | Status: COMPLETED | COMMUNITY
Start: 2022-01-18

## 2022-06-30 NOTE — CONSULT LETTER
[FreeTextEntry3] : Maurilio Wilkinson MD, MPH\par Attending Physician\par Hematology Oncology\par [facility] [place]\par [facility]\par

## 2022-06-30 NOTE — HISTORY OF PRESENT ILLNESS
[de-identified] : Ms. Manju Peña is 41 year old female with monoclonal gammopathy here for consultation, referred by Dr. Norma Woodward. \par \par Patient with past medical history of GERD, fibromyalgia, Sleeve gastrectomy in 2019,  chronic diarrhea/constipation with gastritis on EGD/Colonoscopy in 4/2021. \par Patient abdominal skin removal in Sept 2021\par She was told that  she has low iron and vitamin b12 -last blood work was three months ago (10/21) with her PCP. Started Iron on and off but has yet started b12. \par \par 19/9/21 Immunoglobin A 720\par \par FHx: Denies any family history of hematological and/or oncological disorder\par SHx: Former smoker (quit around 2012)  occasional alcohol use\par \par She has lost around 70 lbs since her gastric surgery and keeping it off. \par Patient has fibromyalgia symptoms with pain on her neck, joints, and muscles. Currently not taking medications. \par \par Normal menses, 4-5 days monthly, LMP - 1/15/22\par Mammogram -tentative scheduled for 1/31/22\par \par 2/22/22 Bone marrow\par - 10-15% involvement by  immunostain\par 12% involvement by aspirate\par 0.2% of cells involvement by flow cytometry\par -Hypercellular marrow with suppressed background trilineage hematopoiesis. \par FISH - normal results. \par PET/CT (2/2022)\par No evidence of lytic bone lesions [de-identified] : Patient is seen today for follow up and review labs from last week \par s/p Venofer 200 mg x 5 in March 2022.\par \par Patient reports of having worsening fibromyalgia when she went away to [place] last month, which improved since she returned to the [facility]. She has Oxycodone to take prn/\par Her menses are normal with 5-6 days monthly, she's due to have it tomorrow 7/1/22\par She gets  b12 injections with her bariatric surgeon every 3 months, is not compliant with multivitamins and b12 as advised. \par Spontaneous bruising on legs and arms improved

## 2022-06-30 NOTE — ASSESSMENT
[FreeTextEntry1] : #smoldering MM\par Patient has fibromyalgia\par IgA Lambda - 745--> 816-> 620\par Lambda - 2.60 -> 2.49 \par M spike - 0.4\par FLCR - 0.87-> 0.93 \par 3/22/22 Bone marrow - 10-15% involvement by  immunostain\par 2/2022 Pet/Ct scan with no lytic lesions\par She has normal renal function and calcium\par \par Patient is clinically doing well\par Labs reviewed, analyzed, and discussed\par Easy bruising changes in severity - normal von Willebrand, factor VIII, INR/PT/PTT\par repeated labs with stable paraprotein, normal calcium and renal function.\par to continue to closely monitoring. \par \par #anemia - JAMEL\par normal menses\par s/p Venofer 200 mg x 5 in March 2022. \par Repeated Ferritin - 19 with stable H/H\par -Given her hx of gastric bypass, IBS, GERD, and low Ferritin - recommends Venofer 200 mg x 5\par - Advised to continue following up with GYN and GI Dr. Marsh.\par \par #right upper quadrant pain and tightness/bloating\par -2/2 to gallstone, Follows by GI Dr. 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 Smoked 1 pack per 2 weeks x15 years\par \par Patient had multiple questions which were answered to satisfaction\par \par d/w Dr. Wilkinson \par rtc in 3 months for follow up\par cbc, cmp, urine/serum MM, b12/folate, iron panel, ferritin

## 2022-07-08 DIAGNOSIS — R11.0 NAUSEA: ICD-10-CM

## 2022-07-08 RX ORDER — ONDANSETRON 4 MG/1
4 TABLET ORAL
Qty: 30 | Refills: 6 | Status: ACTIVE | COMMUNITY
Start: 2022-07-08 | End: 1900-01-01

## 2022-08-01 ENCOUNTER — LABORATORY RESULT (OUTPATIENT)
Age: 42
End: 2022-08-01

## 2022-08-01 ENCOUNTER — APPOINTMENT (OUTPATIENT)
Dept: RHEUMATOLOGY | Facility: CLINIC | Age: 42
End: 2022-08-01

## 2022-08-01 VITALS
OXYGEN SATURATION: 93 % | HEART RATE: 76 BPM | WEIGHT: 176 LBS | HEIGHT: 63.98 IN | DIASTOLIC BLOOD PRESSURE: 60 MMHG | BODY MASS INDEX: 30.05 KG/M2 | SYSTOLIC BLOOD PRESSURE: 120 MMHG

## 2022-08-01 PROCEDURE — 36415 COLL VENOUS BLD VENIPUNCTURE: CPT

## 2022-08-01 PROCEDURE — 99204 OFFICE O/P NEW MOD 45 MIN: CPT | Mod: 25

## 2022-08-02 LAB
25(OH)D3 SERPL-MCNC: 21 NG/ML
ALBUMIN SERPL ELPH-MCNC: 4.3 G/DL
ALP BLD-CCNC: 72 U/L
ALT SERPL-CCNC: 14 U/L
ANION GAP SERPL CALC-SCNC: 14 MMOL/L
AST SERPL-CCNC: 15 U/L
BASOPHILS # BLD AUTO: 0.02 K/UL
BASOPHILS NFR BLD AUTO: 0.5 %
BILIRUB SERPL-MCNC: 0.6 MG/DL
BUN SERPL-MCNC: 12 MG/DL
C3 SERPL-MCNC: 114 MG/DL
C4 SERPL-MCNC: 34 MG/DL
CALCIUM SERPL-MCNC: 9.5 MG/DL
CCP AB SER IA-ACNC: <8 UNITS
CHLORIDE SERPL-SCNC: 105 MMOL/L
CO2 SERPL-SCNC: 22 MMOL/L
CREAT SERPL-MCNC: 0.65 MG/DL
CRP SERPL-MCNC: <3 MG/L
EGFR: 113 ML/MIN/1.73M2
EOSINOPHIL # BLD AUTO: 0.05 K/UL
EOSINOPHIL NFR BLD AUTO: 1.2 %
ERYTHROCYTE [SEDIMENTATION RATE] IN BLOOD BY WESTERGREN METHOD: 36 MM/HR
GLUCOSE SERPL-MCNC: 84 MG/DL
HCT VFR BLD CALC: 38.7 %
HGB BLD-MCNC: 12.1 G/DL
IMM GRANULOCYTES NFR BLD AUTO: 0 %
LYMPHOCYTES # BLD AUTO: 2.26 K/UL
LYMPHOCYTES NFR BLD AUTO: 54.6 %
MAN DIFF?: NORMAL
MCHC RBC-ENTMCNC: 31.3 GM/DL
MCHC RBC-ENTMCNC: 31.3 PG
MCV RBC AUTO: 100.3 FL
MONOCYTES # BLD AUTO: 0.29 K/UL
MONOCYTES NFR BLD AUTO: 7 %
NEUTROPHILS # BLD AUTO: 1.52 K/UL
NEUTROPHILS NFR BLD AUTO: 36.7 %
PLATELET # BLD AUTO: 203 K/UL
POTASSIUM SERPL-SCNC: 3.6 MMOL/L
PROT SERPL-MCNC: 7.4 G/DL
RBC # BLD: 3.86 M/UL
RBC # FLD: 13.4 %
RF+CCP IGG SER-IMP: NEGATIVE
RHEUMATOID FACT SER QL: <10 IU/ML
SODIUM SERPL-SCNC: 141 MMOL/L
VIT B12 SERPL-MCNC: 580 PG/ML
WBC # FLD AUTO: 4.14 K/UL

## 2022-08-15 NOTE — ASSESSMENT
[FreeTextEntry1] : Agree with diagnosis of fibromyalgia.  Pathophysiology and treatment goal reviewed.  Will send serologies to R/O underlying CTD.\par Recommend Cymbalta as first line treatment.

## 2022-08-15 NOTE — HISTORY OF PRESENT ILLNESS
[FreeTextEntry1] : 40 yo female referred by Dr. Wilkinson for further evaluation of joint pain.\par She had pain in her legs since being a child.  Over time, the pain progressed, and she also got headaches, nausea, and felt tired despite sleeping.\par She went to [facility] and was told she tested positive for lupus. She has seen 3-4 rheumatologists.  At [facility] she was told everything was fine.  The other doctor did xrays and gave her NSAIDs, which didn’t help.  She then saw Dr. Rowe in [place], who thought she had fibromyalgia.  He told her to diet and lose weight, which she did.\par She was prescribed Gabapentin 300 mg TID, with no improvement, so she stopped it.  She saw him only once a year.\par \par GI tested her high for elevated Ig A and was referred to [facility].  She had an extensive workup and was diagnosis of smoldering multiple myeloma.  She sees Dr. Wilkinson every 3 months to monitor.  She has not needed treatment yet.\par \par In 2019 she had gastric sleeve and a pannus removal in Sept 2021.\par \par Her left hand started hurting yesterday morning.  No preceding trauma.\par She has pain all over her body.  When she gets her period, her legs feel heavy and hurt a lot. \par Pain is continuous.  She wakes up with pain.  Activity worsens the pain.\par She gets a burning sensation in her back.\par SHe cries from the pain.\par \par No rashes.\par No dry eyes + dry mouth\par No Raynauds\par No oral ulcers\par her legs itch\par her skin is tender to touch\par \par She bruises easily.\par SHe has had 10 iron infusions.\par \par Her fraternal aunt had FM.\par No FH of autoimmune disease.\par \par She takes Oxycodone 10 mg, prescribed by Dr. Gerard, pain management.  He gives her injection in he rlower back.  She has had 3 injections.  She fell down the stairs 3-4 times. She sprained her left wrist and left ankle.  She had a hairline fracture in the right foot.

## 2022-08-15 NOTE — DATA REVIEWED
[FreeTextEntry1] : I have reviewed medical records, including previous and most recent Labs, body imaging, consults and progress notes, [facility].\par

## 2022-08-31 LAB
ANA PAT FLD IF-IMP: ABNORMAL
ANA SER IF-ACNC: ABNORMAL
CENTROMERE IGG SER-ACNC: <0.2 CD:130001892
CHROMATIN AB SERPL-ACNC: <0.2 AL
DSDNA AB SER-ACNC: 36 IU/ML
ENA JO1 AB SER IA-ACNC: <0.2 AL
ENA RNP AB SER IA-ACNC: 0.2 AL
ENA SCL70 IGG SER IA-ACNC: <0.2 AL
ENA SM AB SER IA-ACNC: <0.2 AL
ENA SS-A AB SER IA-ACNC: <0.2 AL
ENA SS-B AB SER IA-ACNC: <0.2 AL
HLA-B27 RELATED AG QL: NEGATIVE
RNA POLYMERASE III IGG: 24 UNITS

## 2022-09-12 ENCOUNTER — APPOINTMENT (OUTPATIENT)
Dept: RHEUMATOLOGY | Facility: CLINIC | Age: 42
End: 2022-09-12

## 2022-09-12 VITALS
OXYGEN SATURATION: 97 % | HEART RATE: 81 BPM | WEIGHT: 176 LBS | SYSTOLIC BLOOD PRESSURE: 122 MMHG | DIASTOLIC BLOOD PRESSURE: 60 MMHG | BODY MASS INDEX: 31.18 KG/M2 | HEIGHT: 63 IN

## 2022-09-12 PROCEDURE — 99214 OFFICE O/P EST MOD 30 MIN: CPT

## 2022-09-16 LAB
EJ AB SER QL: NEGATIVE
ENA JO1 AB SER IA-ACNC: <20 UNITS
ENA PM/SCL AB SER-ACNC: <20 UNITS
ENA SM+RNP AB SER IA-ACNC: <20 UNITS
ENA SS-A IGG SER QL: <20 UNITS
FIBRILLARIN AB SER QL: NEGATIVE
KU AB SER QL: NEGATIVE
MDA-5 (P140)(CADM-140): <20 UNITS
MI2 AB SER QL: NEGATIVE
NXP-2 (P140): <20 UNITS
OJ AB SER QL: NEGATIVE
PL12 AB SER QL: NEGATIVE
PL7 AB SER QL: NEGATIVE
SRP AB SERPL QL: NEGATIVE
TIF GAMMA (P155/140): <20 UNITS
U2 SNRNP AB SER QL: NEGATIVE

## 2022-09-16 NOTE — HISTORY OF PRESENT ILLNESS
[FreeTextEntry1] : He sprained her right ankle going down the stairs.  She went to the ER and xray was negative for fracture.  She is using an air cast and it is getting better with time.\par \par She gets migrating pains on her left knee, then bottom of her foot, then back.  Her left leg is worse bc she is putitn gmore weight on it since she injured the right side.\par \par She developed a rash on her left forearm, then moved to both arms and legs, chin, chest, back and arms.  She went to [facility] and was told it could have been an allergic reaction.  It lasted for a week and then went away on its own.  + mildly itchy.  Rash was barely visible to others.  No new foods, detergents, soaps.  He was exposed to a pit bull around that time but had previously lived with that dog.\par \par Her tMJ started ot hurt n the left when opening her jaw and chewing.

## 2022-09-19 ENCOUNTER — LABORATORY RESULT (OUTPATIENT)
Age: 42
End: 2022-09-19

## 2022-09-29 ENCOUNTER — RESULT REVIEW (OUTPATIENT)
Age: 42
End: 2022-09-29

## 2022-09-29 ENCOUNTER — APPOINTMENT (OUTPATIENT)
Dept: HEMATOLOGY ONCOLOGY | Facility: CLINIC | Age: 42
End: 2022-09-29

## 2022-09-29 VITALS
HEART RATE: 75 BPM | SYSTOLIC BLOOD PRESSURE: 113 MMHG | DIASTOLIC BLOOD PRESSURE: 68 MMHG | WEIGHT: 175 LBS | TEMPERATURE: 98.1 F | BODY MASS INDEX: 31.01 KG/M2 | HEIGHT: 63 IN | OXYGEN SATURATION: 98 % | RESPIRATION RATE: 16 BRPM

## 2022-10-06 ENCOUNTER — APPOINTMENT (OUTPATIENT)
Dept: HEMATOLOGY ONCOLOGY | Facility: CLINIC | Age: 42
End: 2022-10-06

## 2022-10-06 ENCOUNTER — RESULT REVIEW (OUTPATIENT)
Age: 42
End: 2022-10-06

## 2022-10-06 VITALS
WEIGHT: 178 LBS | BODY MASS INDEX: 31.54 KG/M2 | SYSTOLIC BLOOD PRESSURE: 104 MMHG | OXYGEN SATURATION: 99 % | HEIGHT: 63 IN | HEART RATE: 69 BPM | RESPIRATION RATE: 18 BRPM | DIASTOLIC BLOOD PRESSURE: 63 MMHG | TEMPERATURE: 98.3 F

## 2022-10-06 DIAGNOSIS — R31.9 HEMATURIA, UNSPECIFIED: ICD-10-CM

## 2022-10-06 PROCEDURE — 99214 OFFICE O/P EST MOD 30 MIN: CPT | Mod: 25

## 2022-10-06 NOTE — HISTORY OF PRESENT ILLNESS
[de-identified] : Ms. Manju Peña is 41 year old female with monoclonal gammopathy here for consultation, referred by Dr. Norma Woodward. \par \par Patient with past medical history of GERD, fibromyalgia, Sleeve gastrectomy in 2019,  chronic diarrhea/constipation with gastritis on EGD/Colonoscopy in 4/2021. \par Patient abdominal skin removal in Sept 2021\par She was told that  she has low iron and vitamin b12 -last blood work was three months ago (10/21) with her PCP. Started Iron on and off but has yet started b12. \par \par 19/9/21 Immunoglobin A 720\par \par FHx: Denies any family history of hematological and/or oncological disorder\par SHx: Former smoker (quit around 2012)  occasional alcohol use\par \par She has lost around 70 lbs since her gastric surgery and keeping it off. \par Patient has fibromyalgia symptoms with pain on her neck, joints, and muscles. Currently not taking medications. \par \par Normal menses, 4-5 days monthly, LMP - 1/15/22\par Mammogram -tentative scheduled for 1/31/22\par \par 2/22/22 Bone marrow\par - 10-15% involvement by clonal plasma cells\par 12% involvement by aspirate\par 0.2% of cells involvement by flow cytometry\par -Hypercellular marrow with suppressed background trilineage hematopoiesis. \par FISH - normal results. \par PET/CT (2/2022)\par No evidence of lytic bone lesions [de-identified] : Patient is seen today for follow up \par Has had IV venofer x 10\par \par Has more energy but has generalized pain\par SAw Dr Pratt and was started on cymbalta - she has not started as yet\par \par

## 2022-10-06 NOTE — CONSULT LETTER
[Dear  ___] : Dear  [unfilled], [Consult Letter:] : I had the pleasure of evaluating your patient, [unfilled]. [Please see my note below.] : Please see my note below. [Consult Closing:] : Thank you very much for allowing me to participate in the care of this patient.  If you have any questions, please do not hesitate to contact me. [Sincerely,] : Sincerely, [FreeTextEntry3] : Maurilio Wilkinson MD, MPH\par Attending Physician\par Hematology Oncology\par [facility] [place]\par [facility]\par

## 2022-10-06 NOTE — ASSESSMENT
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 Smoked 1 pack per 2 weeks x15 years\par \par Patient had multiple questions which were answered to satisfaction\par \par Labs in 3 months\par cbc, cmp, urine/serum MM, b12/folate, iron panel.\par OV few days later

## 2022-12-12 ENCOUNTER — APPOINTMENT (OUTPATIENT)
Dept: RHEUMATOLOGY | Facility: CLINIC | Age: 42
End: 2022-12-12

## 2022-12-12 VITALS
BODY MASS INDEX: 31.54 KG/M2 | HEIGHT: 63 IN | HEART RATE: 70 BPM | SYSTOLIC BLOOD PRESSURE: 116 MMHG | WEIGHT: 178 LBS | OXYGEN SATURATION: 96 % | DIASTOLIC BLOOD PRESSURE: 86 MMHG

## 2022-12-12 PROCEDURE — 99213 OFFICE O/P EST LOW 20 MIN: CPT

## 2022-12-28 RX ORDER — DULOXETINE HYDROCHLORIDE 20 MG/1
20 CAPSULE, DELAYED RELEASE PELLETS ORAL
Qty: 30 | Refills: 1 | Status: ACTIVE | COMMUNITY
Start: 2022-09-12

## 2022-12-28 NOTE — HISTORY OF PRESENT ILLNESS
[FreeTextEntry1] : She never started Duloxetine.\par \par She has diffuse pain.  When she is active, she gets a burning sensation in her upper back/neck.\par \par Her legs ache at the end of the day when she relaxes after being on them all day.\par \par She wakes up during the night to go to the bathroom.  Sleeps about 6 hours at night and wake sup still feeling tired.  On weekends she will sleep in for several extra hours.\par \par \par

## 2023-01-03 DIAGNOSIS — D64.9 ANEMIA, UNSPECIFIED: ICD-10-CM

## 2023-01-03 DIAGNOSIS — R23.8 OTHER SKIN CHANGES: ICD-10-CM

## 2023-01-12 ENCOUNTER — RESULT REVIEW (OUTPATIENT)
Age: 43
End: 2023-01-12

## 2023-01-12 ENCOUNTER — APPOINTMENT (OUTPATIENT)
Dept: HEMATOLOGY ONCOLOGY | Facility: CLINIC | Age: 43
End: 2023-01-12

## 2023-01-12 VITALS
BODY MASS INDEX: 30.4 KG/M2 | SYSTOLIC BLOOD PRESSURE: 98 MMHG | DIASTOLIC BLOOD PRESSURE: 65 MMHG | RESPIRATION RATE: 17 BRPM | WEIGHT: 171.56 LBS | OXYGEN SATURATION: 100 % | HEIGHT: 63 IN | TEMPERATURE: 97.5 F | HEART RATE: 76 BPM

## 2023-01-18 ENCOUNTER — RESULT REVIEW (OUTPATIENT)
Age: 43
End: 2023-01-18

## 2023-01-18 ENCOUNTER — APPOINTMENT (OUTPATIENT)
Dept: HEMATOLOGY ONCOLOGY | Facility: CLINIC | Age: 43
End: 2023-01-18
Payer: COMMERCIAL

## 2023-01-18 VITALS
DIASTOLIC BLOOD PRESSURE: 62 MMHG | BODY MASS INDEX: 30.4 KG/M2 | OXYGEN SATURATION: 97 % | RESPIRATION RATE: 16 BRPM | WEIGHT: 171.56 LBS | HEIGHT: 63 IN | TEMPERATURE: 97.7 F | SYSTOLIC BLOOD PRESSURE: 99 MMHG | HEART RATE: 74 BPM

## 2023-01-18 DIAGNOSIS — D47.2 MONOCLONAL GAMMOPATHY: ICD-10-CM

## 2023-01-18 PROCEDURE — 36415 COLL VENOUS BLD VENIPUNCTURE: CPT

## 2023-01-18 PROCEDURE — 99214 OFFICE O/P EST MOD 30 MIN: CPT | Mod: 25

## 2023-01-18 NOTE — CONSULT LETTER
[Dear  ___] : Dear  [unfilled], [Consult Letter:] : I had the pleasure of evaluating your patient, [unfilled]. [Please see my note below.] : Please see my note below. [Consult Closing:] : Thank you very much for allowing me to participate in the care of this patient.  If you have any questions, please do not hesitate to contact me. [Sincerely,] : Sincerely, [FreeTextEntry3] : Maurilio Wilkinson MD, MPH\par Attending Physician\par Hematology Oncology\par [facility] [place]\par [facility]\par

## 2023-01-18 NOTE — HISTORY OF PRESENT ILLNESS
[de-identified] : Ms. Manju Peña is 41 year old female with monoclonal gammopathy here for consultation, referred by Dr. Norma Woodward. \par \par Patient with past medical history of GERD, fibromyalgia, Sleeve gastrectomy in 2019,  chronic diarrhea/constipation with gastritis on EGD/Colonoscopy in 4/2021. \par Patient abdominal skin removal in Sept 2021\par She was told that  she has low iron and vitamin b12 -last blood work was three months ago (10/21) with her PCP. Started Iron on and off but has yet started b12. \par \par 19/9/21 Immunoglobin A 720\par \par FHx: Denies any family history of hematological and/or oncological disorder\par SHx: Former smoker (quit around 2012)  occasional alcohol use\par \par She has lost around 70 lbs since her gastric surgery and keeping it off. \par Patient has fibromyalgia symptoms with pain on her neck, joints, and muscles. Currently not taking medications. \par \par Normal menses, 4-5 days monthly, LMP - 1/15/22\par Mammogram -tentative scheduled for 1/31/22\par \par 2/22/22 Bone marrow\par - 10-15% involvement by clonal plasma cells\par 12% involvement by aspirate\par 0.2% of cells involvement by flow cytometry\par -Hypercellular marrow with suppressed background trilineage hematopoiesis. \par FISH - normal results. \par PET/CT (2/2022)\par No evidence of lytic bone lesions [de-identified] : Patient is seen today for follow up \par Has had multiple IV Venofer 200 mg - last 7/2022\par \par patient is doing well, has yet started Cymbalta for fibromyalgia. She keeps on forgetting taking her medications due to being forgetful. \par Menses are now lighter. \par

## 2023-02-27 ENCOUNTER — APPOINTMENT (OUTPATIENT)
Dept: RHEUMATOLOGY | Facility: CLINIC | Age: 43
End: 2023-02-27

## 2023-07-12 ENCOUNTER — RESULT REVIEW (OUTPATIENT)
Age: 43
End: 2023-07-12

## 2023-07-12 ENCOUNTER — APPOINTMENT (OUTPATIENT)
Dept: HEMATOLOGY ONCOLOGY | Facility: CLINIC | Age: 43
End: 2023-07-12

## 2023-07-12 VITALS
BODY MASS INDEX: 29.91 KG/M2 | HEART RATE: 76 BPM | DIASTOLIC BLOOD PRESSURE: 68 MMHG | RESPIRATION RATE: 16 BRPM | HEIGHT: 63 IN | SYSTOLIC BLOOD PRESSURE: 105 MMHG | TEMPERATURE: 98.9 F | WEIGHT: 168.8 LBS | OXYGEN SATURATION: 100 %

## 2023-07-19 ENCOUNTER — APPOINTMENT (OUTPATIENT)
Dept: HEMATOLOGY ONCOLOGY | Facility: CLINIC | Age: 43
End: 2023-07-19
Payer: COMMERCIAL

## 2023-07-19 VITALS
HEIGHT: 63 IN | BODY MASS INDEX: 29.66 KG/M2 | DIASTOLIC BLOOD PRESSURE: 67 MMHG | OXYGEN SATURATION: 100 % | SYSTOLIC BLOOD PRESSURE: 101 MMHG | RESPIRATION RATE: 16 BRPM | HEART RATE: 75 BPM | WEIGHT: 167.4 LBS | TEMPERATURE: 98.6 F

## 2023-07-19 PROCEDURE — 99214 OFFICE O/P EST MOD 30 MIN: CPT | Mod: 25

## 2023-07-19 PROCEDURE — 36415 COLL VENOUS BLD VENIPUNCTURE: CPT

## 2023-07-20 NOTE — ASSESSMENT
[FreeTextEntry1] : ## IgA Lambda Monoclonal gammopathy\par Likely Smoldering MM\par M Dewayne 0.4 --> 0.2 -> 0.2\par 2/2022 PET/CT- no bone mets\par 3/22/22 Bone marrow - 10-15% involvement by clonal plasma cells ( positive)\par She is clinically doing well \par Labs with persistent normal H/H, renal function, and calcium level.\par urine/serum MM all remaining stable.\par Labs reviewed, analyzed, and discussed\par continue to monitor. \par \par ## Iron Deficiency anemia\par s/p multiple IV Venofer 200 mg infusions - last in 7/2022\par -Labs are drawn in the office, reviewed, analyzed, and discussed\par H/H and Ferritin are acceptable - no further IV iron needed. 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 Smoked 1 pack per 2 weeks x15 years\par \par Patient had multiple questions which were answered to satisfaction\par \par Labs in 6 months or earlier if any problems arise. \par cbc, cmp,  b12/folate, iron panel, urine/serum multiple myeloma panel.\par OV few days later

## 2023-07-20 NOTE — HISTORY OF PRESENT ILLNESS
[de-identified] : Ms. Manju Peña is 41 year old female with monoclonal gammopathy here for consultation, referred by Dr. Norma Woodward. \par \par Patient with past medical history of GERD, fibromyalgia, Sleeve gastrectomy in 2019,  chronic diarrhea/constipation with gastritis on EGD/Colonoscopy in 4/2021. \par Patient abdominal skin removal in Sept 2021\par She was told that  she has low iron and vitamin b12 -last blood work was three months ago (10/21) with her PCP. Started Iron on and off but has yet started b12. \par \par 19/9/21 Immunoglobin A 720\par \par FHx: Denies any family history of hematological and/or oncological disorder\par SHx: Former smoker (quit around 2012)  occasional alcohol use\par \par She has lost around 70 lbs since her gastric surgery and keeping it off. \par Patient has fibromyalgia symptoms with pain on her neck, joints, and muscles. Currently not taking medications. \par \par Normal menses, 4-5 days monthly, LMP - 1/15/22\par Mammogram -tentative scheduled for 1/31/22\par \par 2/22/22 Bone marrow\par - 10-15% involvement by clonal plasma cells\par 12% involvement by aspirate\par 0.2% of cells involvement by flow cytometry\par -Hypercellular marrow with suppressed background trilineage hematopoiesis. \par FISH - normal results. \par PET/CT (2/2022)\par No evidence of lytic bone lesions [de-identified] : Patient is seen today for follow up \par Has had multiple IV Venofer 200 mg - last 7/2022\par \par Patient with some fatigue, attributing to her fibromyalgia. \par Menses are now lighter. \par

## 2023-07-20 NOTE — CONSULT LETTER
[Dear  ___] : Dear  [unfilled], [Consult Letter:] : I had the pleasure of evaluating your patient, [unfilled]. [Please see my note below.] : Please see my note below. [Consult Closing:] : Thank you very much for allowing me to participate in the care of this patient.  If you have any questions, please do not hesitate to contact me. [Sincerely,] : Sincerely, [FreeTextEntry3] : Maurilio Wilkinson MD, MPH\par Attending Physician\par Hematology Oncology\par [facility] [place]\par [facility]\par

## 2023-08-01 ENCOUNTER — NON-APPOINTMENT (OUTPATIENT)
Age: 43
End: 2023-08-01

## 2024-01-10 ENCOUNTER — APPOINTMENT (OUTPATIENT)
Dept: HEMATOLOGY ONCOLOGY | Facility: CLINIC | Age: 44
End: 2024-01-10

## 2024-01-10 ENCOUNTER — RESULT REVIEW (OUTPATIENT)
Age: 44
End: 2024-01-10

## 2024-01-10 VITALS
SYSTOLIC BLOOD PRESSURE: 121 MMHG | HEIGHT: 63 IN | OXYGEN SATURATION: 98 % | BODY MASS INDEX: 33.31 KG/M2 | RESPIRATION RATE: 16 BRPM | HEART RATE: 59 BPM | DIASTOLIC BLOOD PRESSURE: 60 MMHG | WEIGHT: 188 LBS | TEMPERATURE: 98.1 F

## 2024-01-17 ENCOUNTER — APPOINTMENT (OUTPATIENT)
Dept: HEMATOLOGY ONCOLOGY | Facility: CLINIC | Age: 44
End: 2024-01-17
Payer: COMMERCIAL

## 2024-01-17 VITALS
TEMPERATURE: 98.3 F | HEIGHT: 64 IN | BODY MASS INDEX: 32.01 KG/M2 | HEART RATE: 63 BPM | RESPIRATION RATE: 16 BRPM | WEIGHT: 187.5 LBS | OXYGEN SATURATION: 100 % | SYSTOLIC BLOOD PRESSURE: 116 MMHG | DIASTOLIC BLOOD PRESSURE: 70 MMHG

## 2024-01-17 DIAGNOSIS — D50.9 IRON DEFICIENCY ANEMIA, UNSPECIFIED: ICD-10-CM

## 2024-01-17 DIAGNOSIS — E53.8 DEFICIENCY OF OTHER SPECIFIED B GROUP VITAMINS: ICD-10-CM

## 2024-01-17 DIAGNOSIS — D47.2 MONOCLONAL GAMMOPATHY: ICD-10-CM

## 2024-01-17 PROCEDURE — 99215 OFFICE O/P EST HI 40 MIN: CPT

## 2024-01-17 NOTE — CONSULT LETTER
[Dear  ___] : Dear  [unfilled], [Consult Letter:] : I had the pleasure of evaluating your patient, [unfilled]. [Please see my note below.] : Please see my note below. [Consult Closing:] : Thank you very much for allowing me to participate in the care of this patient.  If you have any questions, please do not hesitate to contact me. [Sincerely,] : Sincerely, [FreeTextEntry3] : Maurilio Wilkinson MD, MPH\par  Attending Physician\par  Hematology Oncology\par  [facility] [place]\par  [facility]\par

## 2024-01-17 NOTE — HISTORY OF PRESENT ILLNESS
[de-identified] : Ms. Manju Peña is 41 year old female with monoclonal gammopathy here for consultation, referred by Dr. Norma Woodward. \par  \par  Patient with past medical history of GERD, fibromyalgia, Sleeve gastrectomy in 2019,  chronic diarrhea/constipation with gastritis on EGD/Colonoscopy in 4/2021. \par  Patient abdominal skin removal in Sept 2021\par  She was told that  she has low iron and vitamin b12 -last blood work was three months ago (10/21) with her PCP. Started Iron on and off but has yet started b12. \par  \par  19/9/21 Immunoglobin A 720\par  \par  FHx: Denies any family history of hematological and/or oncological disorder\par  SHx: Former smoker (quit around 2012)  occasional alcohol use\par  \par  She has lost around 70 lbs since her gastric surgery and keeping it off. \par  Patient has fibromyalgia symptoms with pain on her neck, joints, and muscles. Currently not taking medications. \par  \par  Normal menses, 4-5 days monthly, LMP - 1/15/22\par  Mammogram -tentative scheduled for 1/31/22\par  \par  2/22/22 Bone marrow\par  - 10-15% involvement by clonal plasma cells\par  12% involvement by aspirate\par  0.2% of cells involvement by flow cytometry\par  -Hypercellular marrow with suppressed background trilineage hematopoiesis. \par  FISH - normal results. \par  PET/CT (2/2022)\par  No evidence of lytic bone lesions [de-identified] : Patient is seen today for follow up  Accompanied by her daughter (Sally Figueroa)  Patient with some fatigue and achiness Attributing to her fibromyalgia.  She had seen Dr. Pratt in the past, but stopped following.  Also no longer taking duloxetine

## 2024-01-17 NOTE — ASSESSMENT
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  Advised to follow-up with rheumatology Labs with persistent normal H/H, renal function, and calcium level. urine/serum MM all remaining stable. continue to monitor.  ## Iron Deficiency anemia s/p multiple IV Venofer 200 mg infusions - last in 7/2022 Menses no mild -Labs reviewed, analyzed, and discussed H/H and Ferritin are acceptable - no further IV iron needed. Advised to start oral iron once a day  Gastric sleeves b12 and MMA acceptable. Advised to take b12 subligual daily  Weight gain Discussed at length about the diet especially trying whole food plant-based diet for 1 to 3 months. Patient can continue longer if desired. Available evidence about the benefits reviewed and discussed with the patient.  Resources in the form of books, websites, documentaries given to the patient in writing. Advised to take B12 1000 mcg QD if the patient chooses to switch to plant based diet. 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 Smoked 1 pack per 2 weeks x15 years  Patient had multiple questions which were answered to satisfaction  Labs in 6 months or earlier if any problems arise. cbc, cmp, b12/folate, iron panel, urine/serum multiple myeloma panel. OV few days later.

## 2024-05-31 ENCOUNTER — APPOINTMENT (OUTPATIENT)
Dept: RHEUMATOLOGY | Facility: CLINIC | Age: 44
End: 2024-05-31
Payer: COMMERCIAL

## 2024-05-31 VITALS
DIASTOLIC BLOOD PRESSURE: 64 MMHG | BODY MASS INDEX: 33.29 KG/M2 | HEIGHT: 64 IN | WEIGHT: 195 LBS | OXYGEN SATURATION: 98 % | HEART RATE: 70 BPM | SYSTOLIC BLOOD PRESSURE: 110 MMHG

## 2024-05-31 DIAGNOSIS — M79.7 FIBROMYALGIA: ICD-10-CM

## 2024-05-31 DIAGNOSIS — R76.8 OTHER SPECIFIED ABNORMAL IMMUNOLOGICAL FINDINGS IN SERUM: ICD-10-CM

## 2024-05-31 PROCEDURE — 99215 OFFICE O/P EST HI 40 MIN: CPT

## 2024-05-31 RX ORDER — NAPROXEN 500 MG/1
500 TABLET ORAL
Qty: 42 | Refills: 2 | Status: ACTIVE | COMMUNITY
Start: 2024-05-31 | End: 2024-08-02

## 2024-05-31 NOTE — PHYSICAL EXAM
[General Appearance - Alert] : alert [Auscultation Breath Sounds / Voice Sounds] : lungs were clear to auscultation bilaterally [Heart Rate And Rhythm] : heart rate was normal and rhythm regular [Heart Sounds] : normal S1 and S2 [No CVA Tenderness] : no ~M costovertebral angle tenderness [FreeTextEntry1] : diffuse tenderness to palpation over upper and lower body, articular and non articular structures [] : no rash [No Focal Deficits] : no focal deficits [Oriented To Time, Place, And Person] : oriented to person, place, and time

## 2024-05-31 NOTE — HISTORY OF PRESENT ILLNESS
[FreeTextEntry1] : 42yo F in the office for evaluation, Smoldering myeloma  History: howie Pratt, 2022 at the time positive HEMA evaluation chronic pain workup low positive dsdna, rna3 myomarker panel negative 2022 evaluation and longitudinal follow up with diagnosis of fibromyalgia prior rheum evaluations reported non compliance with duloxetine no major health events since last medical follow up no synovitis no dactylitis no sle typical rashes reported diffuse widespread pain described today  Hematology: smoldering myeloma on regular labs and follow up

## 2024-06-02 LAB — DSDNA AB SER-ACNC: 6 IU/ML

## 2024-06-03 LAB
C3 SERPL-MCNC: 130 MG/DL
C4 SERPL-MCNC: 32 MG/DL

## 2024-07-12 ENCOUNTER — RESULT REVIEW (OUTPATIENT)
Age: 44
End: 2024-07-12

## 2024-07-12 ENCOUNTER — APPOINTMENT (OUTPATIENT)
Dept: HEMATOLOGY ONCOLOGY | Facility: CLINIC | Age: 44
End: 2024-07-12

## 2024-07-12 VITALS
HEART RATE: 62 BPM | TEMPERATURE: 98.2 F | RESPIRATION RATE: 16 BRPM | OXYGEN SATURATION: 100 % | HEIGHT: 64 IN | BODY MASS INDEX: 33.72 KG/M2 | DIASTOLIC BLOOD PRESSURE: 71 MMHG | WEIGHT: 197.5 LBS | SYSTOLIC BLOOD PRESSURE: 109 MMHG

## 2024-07-18 ENCOUNTER — APPOINTMENT (OUTPATIENT)
Dept: HEMATOLOGY ONCOLOGY | Facility: CLINIC | Age: 44
End: 2024-07-18
Payer: COMMERCIAL

## 2024-07-18 VITALS
WEIGHT: 198 LBS | HEART RATE: 75 BPM | TEMPERATURE: 98.1 F | OXYGEN SATURATION: 98 % | RESPIRATION RATE: 16 BRPM | SYSTOLIC BLOOD PRESSURE: 111 MMHG | HEIGHT: 64 IN | BODY MASS INDEX: 33.8 KG/M2 | DIASTOLIC BLOOD PRESSURE: 67 MMHG

## 2024-07-18 DIAGNOSIS — D50.9 IRON DEFICIENCY ANEMIA, UNSPECIFIED: ICD-10-CM

## 2024-07-18 DIAGNOSIS — D47.2 MONOCLONAL GAMMOPATHY: ICD-10-CM

## 2024-07-18 PROCEDURE — 99215 OFFICE O/P EST HI 40 MIN: CPT

## 2024-07-18 PROCEDURE — G2211 COMPLEX E/M VISIT ADD ON: CPT

## 2024-08-19 ENCOUNTER — RX RENEWAL (OUTPATIENT)
Age: 44
End: 2024-08-19